# Patient Record
(demographics unavailable — no encounter records)

---

## 2025-06-22 NOTE — PHYSICAL EXAM

## 2025-06-23 NOTE — HEALTH RISK ASSESSMENT
[Excellent] : ~his/her~  mood as  excellent [2 - 4 times a month (2 pts)] : 2-4 times a month (2 points) [No] : In the past 12 months have you used drugs other than those required for medical reasons? No [No falls in past year] : Patient reported no falls in the past year [0] : 2) Feeling down, depressed, or hopeless: Not at all (0) [PHQ-2 Negative - No further assessment needed] : PHQ-2 Negative - No further assessment needed [Never] : Never [YES] : Yes [Are there any children in your household?] : There are children in the household. [Have you attended a firearm safety workshop or class?] : A firearm safety workshop or class has been attended. [None] : None [With Significant Other] : lives with significant other [With Family] : lives with family [# of Members in Household ___] :  household currently consist of [unfilled] member(s) [Employed] : employed [College] : College [] :  [# Of Children ___] : has [unfilled] children [Sexually Active] : sexually active [Feels Safe at Home] : Feels safe at home [Fully functional (bathing, dressing, toileting, transferring, walking, feeding)] : Fully functional (bathing, dressing, toileting, transferring, walking, feeding) [Fully functional (using the telephone, shopping, preparing meals, housekeeping, doing laundry, using] : Fully functional and needs no help or supervision to perform IADLs (using the telephone, shopping, preparing meals, housekeeping, doing laundry, using transportation, managing medications and managing finances) [Smoke Detector] : smoke detector [Carbon Monoxide Detector] : carbon monoxide detector [Seat Belt] :  uses seat belt [Sunscreen] : uses sunscreen [Reviewed no changes] : Reviewed, no changes [Designated Healthcare Proxy] : Designated healthcare proxy [Name: ___] : Health Care Proxy's Name: [unfilled]  [Relationship: ___] : Relationship: [unfilled] [Audit-CScore] : 0 [de-identified] : exercises [de-identified] : good [[id number]] : 0 [Are there any unlocked firearms stored in your household?] : No unlocked firearms in the household. [Are there any firearms stored in your household that are loaded?] : No firearms are stored in the household loaded. [Has anyone in the household been feeling low/depressed/been struggling?] : No one in the household has been feeling low/depressed/been struggling. [Change in mental status noted] : No change in mental status noted [Reports changes in hearing] : Reports no changes in hearing [Reports changes in vision] : Reports no changes in vision [Reports changes in dental health] : Reports no changes in dental health [FreeTextEntry2] : [employer] working in the [employer] [AdvancecareDate] : 06/25

## 2025-06-23 NOTE — HEALTH RISK ASSESSMENT
[Excellent] : ~his/her~  mood as  excellent [2 - 4 times a month (2 pts)] : 2-4 times a month (2 points) [No] : In the past 12 months have you used drugs other than those required for medical reasons? No [No falls in past year] : Patient reported no falls in the past year [0] : 2) Feeling down, depressed, or hopeless: Not at all (0) [PHQ-2 Negative - No further assessment needed] : PHQ-2 Negative - No further assessment needed [Never] : Never [YES] : Yes [Are there any children in your household?] : There are children in the household. [Have you attended a firearm safety workshop or class?] : A firearm safety workshop or class has been attended. [None] : None [With Significant Other] : lives with significant other [With Family] : lives with family [# of Members in Household ___] :  household currently consist of [unfilled] member(s) [Employed] : employed [College] : College [] :  [# Of Children ___] : has [unfilled] children [Sexually Active] : sexually active [Feels Safe at Home] : Feels safe at home [Fully functional (bathing, dressing, toileting, transferring, walking, feeding)] : Fully functional (bathing, dressing, toileting, transferring, walking, feeding) [Fully functional (using the telephone, shopping, preparing meals, housekeeping, doing laundry, using] : Fully functional and needs no help or supervision to perform IADLs (using the telephone, shopping, preparing meals, housekeeping, doing laundry, using transportation, managing medications and managing finances) [Smoke Detector] : smoke detector [Carbon Monoxide Detector] : carbon monoxide detector [Seat Belt] :  uses seat belt [Sunscreen] : uses sunscreen [Reviewed no changes] : Reviewed, no changes [Designated Healthcare Proxy] : Designated healthcare proxy [Name: ___] : Health Care Proxy's Name: [unfilled]  [Relationship: ___] : Relationship: [unfilled] [Audit-CScore] : 0 [de-identified] : exercises [de-identified] : good [[id number]] : 0 [Are there any unlocked firearms stored in your household?] : No unlocked firearms in the household. [Are there any firearms stored in your household that are loaded?] : No firearms are stored in the household loaded. [Has anyone in the household been feeling low/depressed/been struggling?] : No one in the household has been feeling low/depressed/been struggling. [Change in mental status noted] : No change in mental status noted [Reports changes in hearing] : Reports no changes in hearing [Reports changes in vision] : Reports no changes in vision [Reports changes in dental health] : Reports no changes in dental health [FreeTextEntry2] : [employer] working in the [employer] [AdvancecareDate] : 06/25

## 2025-06-23 NOTE — HISTORY OF PRESENT ILLNESS
[de-identified] : 37-year-old male with no significant past medical history presents for his yearly physical.  The patient generally feels well without any complaints including no chest pain, palpitations, shortness of breath or edema.  He is following with a health maintenance clinic stating that his testosterone level is low normal at about 300 therefore taking natural testosterone.  Would like level rechecked.  He has no significant medical, childhood history or any hospitalizations.  Patient has a history of having iron deficiency anemia with GI workup negative who's feeling was that iron deficiency anemia was secondary to patient being a frequent blood donor which he has continued therefore takes iron daily.  No further anemia.  The patient had a vasectomy after having a second child 2019 which "didn't go well" where he states urology told him "one side worked and the other side didn't". He was offered a second procedure which he has not done. His wife has had a tubal ligation.  Significant family history with his father at 60 years old diagnosed with colon cancer with one lymph node positive and receiving chemotherapy.  He had been doing well but had progressive disease and then received immunotherapy but unfortunately to no avail and he passed away at 62 years old February 2025. Condolences given.    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

## 2025-06-23 NOTE — COUNSELING
[Good understanding] : Patient has a good understanding of disease, goals and obesity follow-up plan [None] : None [de-identified] : Continue diet and exercise

## 2025-06-23 NOTE — ASSESSMENT
[Vaccines Reviewed] : Immunizations reviewed today. Please see immunization details in the vaccine log within the immunization flowsheet.  [FreeTextEntry1] : 37-year-old male with good general health without any chronic medical issues and no bad health habits. Patient encouraged to continue diet exercise.  significant no family history is that his father at 60 years old was diagnosed with colon cancer.  Unfortunately his father's disease progressed and he passed away at 62 years old February 2025.  Condolences given. With this family history by guideline patient recommended to have colonoscopy at 40 but is concerned now therefore given referral to see GI for a consult.  Low testosterone by other provider therefore will be rechecked today.  History of iron deficiency anemia secondary to frequent blood donations which the patient still does but takes iron daily.  Iron levels will be checked with today's blood work.  Venipuncture done today in the office  Recieved  Influenza and COVID vaccines Tdap May 2019  Followup yearly and as needed

## 2025-06-23 NOTE — HISTORY OF PRESENT ILLNESS
[de-identified] : 37-year-old male with no significant past medical history presents for his yearly physical.  The patient generally feels well without any complaints including no chest pain, palpitations, shortness of breath or edema.  He is following with a health maintenance clinic stating that his testosterone level is low normal at about 300 therefore taking natural testosterone.  Would like level rechecked.  He has no significant medical, childhood history or any hospitalizations.  Patient has a history of having iron deficiency anemia with GI workup negative who's feeling was that iron deficiency anemia was secondary to patient being a frequent blood donor which he has continued therefore takes iron daily.  No further anemia.  The patient had a vasectomy after having a second child 2019 which "didn't go well" where he states urology told him "one side worked and the other side didn't". He was offered a second procedure which he has not done. His wife has had a tubal ligation.  Significant family history with his father at 60 years old diagnosed with colon cancer with one lymph node positive and receiving chemotherapy.  He had been doing well but had progressive disease and then received immunotherapy but unfortunately to no avail and he passed away at 62 years old February 2025. Condolences given.    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

## 2025-06-23 NOTE — COUNSELING
[Good understanding] : Patient has a good understanding of disease, goals and obesity follow-up plan [None] : None [de-identified] : Continue diet and exercise